# Patient Record
Sex: MALE | Race: WHITE
[De-identification: names, ages, dates, MRNs, and addresses within clinical notes are randomized per-mention and may not be internally consistent; named-entity substitution may affect disease eponyms.]

---

## 2017-12-18 ENCOUNTER — HOSPITAL ENCOUNTER (OUTPATIENT)
Dept: HOSPITAL 92 - RAD | Age: 27
Discharge: HOME | End: 2017-12-18
Attending: ORTHOPAEDIC SURGERY
Payer: COMMERCIAL

## 2017-12-18 DIAGNOSIS — M75.91: ICD-10-CM

## 2017-12-18 DIAGNOSIS — M25.511: Primary | ICD-10-CM

## 2017-12-18 PROCEDURE — A9579 GAD-BASE MR CONTRAST NOS,1ML: HCPCS

## 2017-12-18 PROCEDURE — 23350 INJECTION FOR SHOULDER X-RAY: CPT

## 2017-12-18 NOTE — MRI
MRI OF THE RIGHT SHOULDER WITH INTERARTICULAR CONTRAST:

 

Date:  12/18/17 

 

INDICATION:

Right shoulder pain. 

 

COMPARISON:  

Right shoulder arthrogram dated 12/18/17. 

 

TECHNIQUE:  

Multiplanar, multisequence MR images were obtained of the right shoulder following administration of 
interarticular Gadolinium contrast. There was some difficulty in the arthrogram injection due to some
 patient positioning. Please see the arthrogram report for full details concerning the injection tech
nique. A small amount of contrast did get into the region of the subcoracoid recess, but not a signif
icant amount was actually seen within the main compartment of the glenohumeral joint. 

 

FINDINGS:

 

The rotator cuff appears intact. There is some moderate tendinosis of the supraspinatus and infraspin
atus. The biceps tendon is located. The visualized biceps anchor and superior glenoid labrum appears 
within normal limits. There is suspected high attachment of the anterior band of the inferior glenohu
meral ligament. No definite disruption is seen involving the inferior glenohumeral ligamentous comple
x. The AC joint is normal appearing. There is a small periarticular cyst-like abnormality adjacent to
 the distal clavicle likely reflecting a small ganglion. This measures 4.5 mm. The coracoclavicular l
igaments appear intact. No muscular atrophy is evident. No enlarged lymph nodes are present. The visu
alized glenohumeral articular surface appears within normal limits. 

 

IMPRESSION: 

1.  Limitations to the exam. 

 

2.  No definite full thickness rotator cuff tear is evident. There is mild tendinosis of the supraspi
natus and infraspinatus. 

 

3.  Biceps anchor complex and inferior glenohumeral labral ligamentous complex appears intact. 

 

4.  Biceps tendon is located. 

 

5.  No muscular atrophy is evident. 

 

6.  If there remains further concern for labral ligamentous injury, a repeat MR arthrogram of the rig
ht shoulder could be performed for this patient at no extra cost. 

 

 

 

 

POS: OFF

## 2017-12-18 NOTE — RAD
ARTHROGRAM RIGHT SHOULDER:

 

Date: 12-18-17 

 

History: 

27-year-old male with right shoulder pain. 

 

Technique: 

Signed informed consent obtained.  radiographs obtained. Anterior approach. Overlying skin of [facility] shoulder prepped and draped in the usual sterile fashion. 25 gauge needle used to apply buffered 
Lidocaine. 22 gauge spinal needle advanced into the region of the right glenohumeral joint from anter
ior approach. Multiple attempts, but unsuccessful in reaching the intracapsular space. Contrast injec
gonzalo, but most of it spilled out of the tubing. Some of it was injected into the soft tissues. Little 
or none entered the glenohumeral joint space. 

 

IMPRESSION: 

Technically unsuccessful right shoulder arthrogram.

 

 

 

POS: [facility]

## 2018-01-08 ENCOUNTER — HOSPITAL ENCOUNTER (OUTPATIENT)
Dept: HOSPITAL 92 - RAD | Age: 28
Discharge: HOME | End: 2018-01-08
Attending: ORTHOPAEDIC SURGERY
Payer: COMMERCIAL

## 2018-01-08 DIAGNOSIS — M25.511: Primary | ICD-10-CM

## 2018-01-08 DIAGNOSIS — M75.91: ICD-10-CM

## 2018-01-08 PROCEDURE — A9579 GAD-BASE MR CONTRAST NOS,1ML: HCPCS

## 2018-01-08 PROCEDURE — 23350 INJECTION FOR SHOULDER X-RAY: CPT

## 2018-01-08 NOTE — MRI
MRI RIGHT SHOULDER WITH INTRAARTICULAR CONTRAST:

 

HISTORY:

Pain.

 

COMPARISON:

MRI from 12/15/2017.

 

FINDINGS:

Biceps tendon:  Intact.

Glenoid labrum:  There is a free edge defect of the posterior superior labrum. The anterior inferior 
labrum has the appearance of tear on the axial images although the anterior band IGHL infolding cause
s this appearance.

Foramen:  Normal sublabral foramen.

Rotator cuff:  Mild tendinosis.  No full-thickness perforation or significant partial undersurface te
aring.

Bones:  Type 1 acromion.  No fracture.  No malalignment.

Muscles:  Normal muscle signal and bulk.

 

IMPRESSION:

1.  Free edge defect of the posterior superior labrum 2 x 2 mm.

 

2.  Intact periosteum.

 

3.  Mild tendinosis of the rotator cuff.

 

4.  No full-thickness perforation or significant partial undersurface tearing.

 

5.  Normal appearance of the muscle.

 

6.  Type 1 acromion.

 

7.  Normal sublabral foramen.

 

POS: [facility]

## 2018-01-08 NOTE — RAD
RIGHT SHOULDER ARTHROGRAM FOR MRI:

 

History: Right shoulder pain. M25.51

 

Comparison: Arthrogram 12-18-17

 

FINDINGS: 

Patient was brought to the fluoroscopy suite where all questions were answered. 

 

Patient's right shoulder was prepped and draped in normal sterile fashion. Informed consent was also 
obtained. 

 

Three ml Lidocaine was administered to the superficial and deep soft tissues. 22 gauge needle was use
d to access the right shoulder joint after which intraarticular contrast was instilled, 10 ml. 

 

IMPRESSION: 

Technically successful right shoulder arthrogram for MRI. 

 

Fluoro time: 0.5 minutes.

 

POS: [facility]